# Patient Record
Sex: FEMALE | Race: WHITE | URBAN - METROPOLITAN AREA
[De-identification: names, ages, dates, MRNs, and addresses within clinical notes are randomized per-mention and may not be internally consistent; named-entity substitution may affect disease eponyms.]

---

## 2021-10-12 ENCOUNTER — HOSPITAL ENCOUNTER (EMERGENCY)
Age: 31
Discharge: HOME | End: 2021-10-12
Payer: MEDICAID

## 2021-10-12 VITALS
HEIGHT: 62 IN | BODY MASS INDEX: 21.36 KG/M2 | OXYGEN SATURATION: 100 % | WEIGHT: 116.1 LBS | HEART RATE: 105 BPM | TEMPERATURE: 98.8 F | RESPIRATION RATE: 16 BRPM | SYSTOLIC BLOOD PRESSURE: 119 MMHG | DIASTOLIC BLOOD PRESSURE: 86 MMHG

## 2021-10-12 DIAGNOSIS — S61.211A LACERATION OF LEFT INDEX FINGER WITHOUT FOREIGN BODY WITHOUT DAMAGE TO NAIL, INITIAL ENCOUNTER: Primary | ICD-10-CM

## 2021-10-12 PROCEDURE — 99282 EMERGENCY DEPT VISIT SF MDM: CPT

## 2021-10-12 ASSESSMENT — PAIN SCALES - GENERAL: PAINLEVEL_OUTOF10: 6

## 2021-10-12 NOTE — ED PROVIDER NOTES
[address] [facility]        Pt Name: Napoleon Michaud  MRN: [id number]  [place] 1990  Date of evaluation: 10/12/2021  Provider: Austyn Torres PA-C  PCP: No primary care provider on file. Note Started: 4:15 PM EDT       CLARISSA. I have evaluated this patient. My supervising physician was available for consultation. CHIEF COMPLAINT       Chief Complaint   Patient presents with    Laceration     pt cut left index finger two hours ago on trimmers. HISTORY OF PRESENT ILLNESS   (Location, Timing/Onset, Context/Setting, Quality, Duration, Modifying Factors, Severity, Associated Signs and Symptoms)  Note limiting factors. Chief Complaint: Left finger laceration    Napoleon Michaud is a 28 y.o. female who presents to the emergency department with a chief complaint of a laceration to her left finger. She states that she was using hedge tremors and she accidentally cut her left index and middle finger. Is only very small 1 mm skin tear to the middle finger but she has a laceration over the distal left index finger that she was more concerned about. Last tetanus vaccination she states this for sure within the last 5 years. She is right-hand dominant. Denies any other injury. Nursing Notes were all reviewed and agreed with or any disagreements were addressed in the HPI. REVIEW OF SYSTEMS    (2-9 systems for level 4, 10 or more for level 5)     Review of Systems    Positives and Pertinent negatives as per HPI. Except as noted above in the ROS, all other systems were reviewed and negative.        PAST MEDICAL HISTORY     Past Medical History:   Diagnosis Date    Endometriosis     Functional ovarian cysts     Multiple gastric ulcers          SURGICAL HISTORY     Past Surgical History:   Procedure Laterality Date     SECTION      HYSTERECTOMY      partial         CURRENTMEDICATIONS       There are no discharge medications for this patient. ALLERGIES     Codeine    FAMILYHISTORY     No family history on file. SOCIAL HISTORY       Social History     Tobacco Use    Smoking status: Current Every Day Smoker     Packs/day: 1.00    Smokeless tobacco: Never Used   Substance Use Topics    Alcohol use: Yes    Drug use: No       SCREENINGS             PHYSICAL EXAM    (up to 7 for level 4, 8 or more for level 5)     ED Triage Vitals [10/12/21 1549]   BP Temp Temp Source Pulse Resp SpO2 Height Weight   119/86 98.8 °F (37.1 °C) Oral 105 16 100 % 5' 2\" (1.575 m) 116 lb 1.6 oz (52.7 kg)       Physical Exam  Vitals and nursing note reviewed. Constitutional:       Appearance: She is well-developed. She is not diaphoretic. HENT:      Head: Atraumatic. Nose: Nose normal.   Eyes:      General:         Right eye: No discharge. Left eye: No discharge. Cardiovascular:      Pulses: Normal pulses. Comments: 2+ radial pulse in left wrist  Musculoskeletal:      Cervical back: Normal range of motion. Comments: There are lacerations over the left second and third finger. There is 1 over the pad of the left index finger that is already well approximated and not currently bleeding measuring 1 cm. There are couple skin tears and punctate wounds not currently bleeding on the palmar aspect of the left middle finger. Full range of motion against resistance with flexion extension of the left second and third DIP, PIP, MCP. Capillary refill brisk. Skin:     General: Skin is warm and dry. Findings: No erythema or rash. Neurological:      Mental Status: She is alert and oriented to person, place, and time. Cranial Nerves: No cranial nerve deficit. Psychiatric:         Behavior: Behavior normal.         DIAGNOSTIC RESULTS   LABS:    Labs Reviewed - No data to display    When ordered only abnormal lab results are displayed.  All other labs were within normal range or not returned as of this dictation. EKG: When ordered, EKG's are interpreted by the Emergency Department Physician in the absence of a cardiologist.  Please see their note for interpretation of EKG. RADIOLOGY:   Non-plain film images such as CT, Ultrasound and MRI are read by the radiologist. Plain radiographic images are visualized and preliminarily interpreted by the ED Provider with the below findings:        Interpretation per the Radiologist below, if available at the time of this note:    No orders to display     No results found. PROCEDURES   Unless otherwise noted below, none     Procedures    CRITICAL CARE TIME   N/A    CONSULTS:  None      EMERGENCY DEPARTMENT COURSE and DIFFERENTIAL DIAGNOSIS/MDM:   Vitals:    Vitals:    10/12/21 1549   BP: 119/86   Pulse: 105   Resp: 16   Temp: 98.8 °F (37.1 °C)   TempSrc: Oral   SpO2: 100%   Weight: 116 lb 1.6 oz (52.7 kg)   Height: 5' 2\" (1.575 m)       Patient was given the following medications:  Medications - No data to display        Patient presented with laceration of the left index finger. Just a couple abrasions of the left middle finger. This would benefit from some Dermabond but we are on national Sorger agent backordered do not have Dermabond here. Patient does not want sutures. She states that she will just go to the store and get liquid Band-Aid. Believe this is reasonable. There is no current bleeding. This was cleaned thoroughly here in the emergency department with chlorhexidine, water. She is up-to-date on her tetanus. Nothing to suggest foreign body, fracture, tendon laceration. She has no bony tenderness and has full range of motion. FINAL IMPRESSION      1.  Laceration of left index finger without foreign body without damage to nail, initial encounter          DISPOSITION/PLAN   DISPOSITION Decision To Discharge 10/12/2021 04:43:36 PM      PATIENT REFERRED TO:  [facility] Emergency Department  [place] [id number]  [phone]    As needed      DISCHARGE MEDICATIONS:  There are no discharge medications for this patient. DISCONTINUED MEDICATIONS:  There are no discharge medications for this patient.              (Please note that portions of this note were completed with a voice recognition program.  Efforts were made to edit the dictations but occasionally words are mis-transcribed.)    Raul Beverly PA-C (electronically signed)           Raul Beverly PA-C  10/12/21 192